# Patient Record
Sex: FEMALE | Race: WHITE | NOT HISPANIC OR LATINO | Employment: STUDENT | URBAN - METROPOLITAN AREA
[De-identification: names, ages, dates, MRNs, and addresses within clinical notes are randomized per-mention and may not be internally consistent; named-entity substitution may affect disease eponyms.]

---
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
## 2025-04-26 ENCOUNTER — OFFICE VISIT (OUTPATIENT)
Dept: URGENT CARE | Age: 20
End: 2025-04-26
Payer: COMMERCIAL

## 2025-04-26 VITALS
SYSTOLIC BLOOD PRESSURE: 136 MMHG | DIASTOLIC BLOOD PRESSURE: 77 MMHG | OXYGEN SATURATION: 98 % | HEART RATE: 74 BPM | RESPIRATION RATE: 16 BRPM | TEMPERATURE: 97.1 F

## 2025-04-26 DIAGNOSIS — L03.90 CELLULITIS, UNSPECIFIED CELLULITIS SITE: Primary | ICD-10-CM

## 2025-04-26 RX ORDER — FLUOXETINE HYDROCHLORIDE 40 MG/1
40 CAPSULE ORAL DAILY
COMMUNITY

## 2025-04-26 RX ORDER — CEPHALEXIN 500 MG/1
500 CAPSULE ORAL 2 TIMES DAILY
Qty: 30 CAPSULE | Refills: 0 | Status: SHIPPED | OUTPATIENT
Start: 2025-04-26 | End: 2025-05-03

## 2025-04-26 ASSESSMENT — ENCOUNTER SYMPTOMS
CHILLS: 0
FEVER: 0

## 2025-04-26 NOTE — LETTER
April 26, 2025     Patient: Dawn Jordan   YOB: 2005   Date of Visit: 4/26/2025       To Whom It May Concern:    Dawn Jordan was seen in my clinic on 4/26/2025 at 2:30 pm. Please excuse Dawn for her absence from work on this day.    If you have any questions or concerns, please don't hesitate to call.         Sincerely,         Virginia Lockett PA-C

## 2025-04-26 NOTE — PROGRESS NOTES
Subjective   Patient ID: Dawn Jordan is a 20 y.o. female. They present today with a chief complaint of skin irritation.    History of Present Illness  Patient presents for evaluation of possible skin infection to left groin.  She states that approximately 2 weeks ago she thought she may have had an ingrown hair in this area.  She did try picking at it and states she got a small amount of yellow drainage from it.  It is painful when she walks and her underwear rubs on it.  She states she does not use a razor down there.  She is not sexually active.  She has no prior history of abscesses or cyst to her sister does and was advised to come in to make sure she does not have this.  Denies any fevers or chills.          Past Medical History  Allergies as of 04/26/2025 - Reviewed 04/26/2025   Allergen Reaction Noted    Ragweed Other 12/01/2023       Prescriptions Prior to Admission[1]     Medical History[2]    Surgical History[3]         Review of Systems  Review of Systems   Constitutional:  Negative for chills and fever.   Skin:         Positive for redness and drainage and pain to left groin                                  Objective    Vitals:    04/26/25 1441   BP: 136/77   Pulse: 74   Resp: 16   Temp: 36.2 °C (97.1 °F)   SpO2: 98%     No LMP recorded.    Physical Exam  Vitals reviewed.   Constitutional:       General: She is not in acute distress.     Appearance: She is not ill-appearing.   Skin:            Comments: Approximately 5 mm erythematous papular lesion with overlying scabbing to left groin, there is no surrounding redness or warmth or active drainage.  This is tender to palpation.   Neurological:      Mental Status: She is alert.         Procedures    Point of Care Test & Imaging Results from this visit  No results found for this visit on 04/26/25.   Imaging  No results found.    Cardiology, Vascular, and Other Imaging  No other imaging results found for the past 2 days      Diagnostic study results (if  any) were reviewed by Virginia Lockett PA-C.    Assessment/Plan   Allergies, medications, history, and pertinent labs/EKGs/Imaging reviewed by Virginia Lockett PA-C.     Medical Decision Making  MDM- Signs and symptoms consistent with cellulitis. No evidence of sepsis, abscess, or other complications. Plan is for antibiotic therapy and symptomatic support at home. Plan to follow with PCP. Patient advised to return to clinic or go to the ED if symptoms change or worsen. Patient verbalized understanding and agrees with plan.      Orders and Diagnoses  Diagnoses and all orders for this visit:  Cellulitis, unspecified cellulitis site  -     cephalexin (Keflex) 500 mg capsule; Take 1 capsule (500 mg) by mouth 2 times a day for 7 days.      Medical Admin Record      Patient disposition: Home    Electronically signed by Virginia Lockett PA-C  2:52 PM           [1] (Not in a hospital admission)   [2]   Past Medical History:  Diagnosis Date    Acute pharyngitis, unspecified 05/17/2016    Sore throat    Acute upper respiratory infection, unspecified 11/11/2016    Acute URI    Encounter for follow-up examination after completed treatment for conditions other than malignant neoplasm 03/22/2016    Follow-up exam after treatment    Encounter for immunization 01/06/2016    Immunization due    Low back pain, unspecified 04/30/2019    Chronic low back pain    Other instability, right shoulder 04/30/2019    Instability of both shoulder joints    Otitis media, unspecified, right ear 03/02/2016    Acute right otitis media   [3]   Past Surgical History:  Procedure Laterality Date    TONSILLECTOMY  07/07/2015    Tonsillectomy With Adenoidectomy